# Patient Record
(demographics unavailable — no encounter records)

---

## 2024-10-16 NOTE — HISTORY OF PRESENT ILLNESS
[FreeTextEntry1] : DOI 10/13/24  43 year old female here today for a right middle finger injury sustained three days prior from a slip and fall.  Speaks mostly been Arnaud however her  helps translate.   offered however they deferred.  She was seen in the emergency room where x-rays were remarkable for a right middle finger proximal phalanx injury and splinted in extension.  She is been compliant with the splint ever since.  She reports that the pain has been manageable with Advil.  Denies any paresthesias.  Notable past medical history only for hypertension.

## 2024-10-16 NOTE — DISCUSSION/SUMMARY
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  I had an extensive discussion about conservative versus surgical management and based on the malrotation I did offer her surgery.  We discussed her most likely benign bone cyst at length and possible enchondroma however definitive diagnosis cannot be determined without a biopsy which may not be necessary.  I discussed percutaneous pinning versus percutaneous screw placement.  We discussed the postoperative protocol.  We discussed the risk of surgery to include but not limited to, infection, finger stiffness, malunion, nonunion and malrotation of the finger.  The benefits would be malrotation correction and improved function.  Knowing others and benefits of the procedure she decided to move forward with surgery.  I would like to get her on the schedule for this upcoming Thursday.

## 2024-10-16 NOTE — PHYSICAL EXAM
[de-identified] : Right hand Malrotation of the middle finger in extension and would not let the examiner flex the finger due to pain, I did offer a digital block however patient deferred Middle finger significant swelling Sensation intact to light touch to all distal fingertips Hand is warm and well-perfused [de-identified] : X-rays of the right middle finger from the emergency room, 3 views were evaluated with myself which demonstrated a proximal phalanx fracture through a most likely enchondroma.  2 large enchondromas seen at the middle finger proximal phalanx

## 2024-11-04 NOTE — HISTORY OF PRESENT ILLNESS
[Xray (Date:___)] : [unfilled] Xray -  [Doing Well] : is doing well [No Sign of Infection] : is showing no signs of infection [Adequate Pain Control] : has adequate pain control [de-identified] : DOS 10/24/24  43F postop from right MF pathologic P1 FX s/p pinning and bone grafting  [de-identified] : Right MF  pins- clean with no signs of infection SITLT NO malrotation of finger in extension [de-identified] : pins in place and fx reduced.  [de-identified] : Splint replaced F/u in two weeks for repeat xrays and splint exchange

## 2024-11-19 NOTE — HISTORY OF PRESENT ILLNESS
[Xray (Date:___)] : [unfilled] Xray -  [Doing Well] : is doing well [de-identified] : DOS 10/24/24  43F postop from right MF pathologic P1 FX s/p pinning and bone grafting now 3.5 weeks out from pinning.  [de-identified] : Right MF pins- clean with no signs of infection SITLT NO malrotation of finger in extension. [de-identified] : Imagin24 Xray, right middle finger, 3 views . pins in place and fx reduced. [de-identified] : Splint replaced F/u in one week for repeat xrays and pin removal OT script placed to start once pins removed Discussed with patient using

## 2024-11-19 NOTE — HISTORY OF PRESENT ILLNESS
[Xray (Date:___)] : [unfilled] Xray -  [Doing Well] : is doing well [de-identified] : DOS 10/24/24  43F postop from right MF pathologic P1 FX s/p pinning and bone grafting now 3.5 weeks out from pinning.  [de-identified] : Right MF pins- clean with no signs of infection SITLT NO malrotation of finger in extension. [de-identified] : Imagin24 Xray, right middle finger, 3 views . pins in place and fx reduced. [de-identified] : Splint replaced F/u in one week for repeat xrays and pin removal OT script placed to start once pins removed Discussed with patient using

## 2024-11-26 NOTE — BEGINNING OF VISIT
[] :  [Other: ______] : provided by KYRIE [Interpreters_IDNumber] : [id number] [FreeTextEntry3] : Bryan [Interpreters_FullName] : Rey Beltran

## 2024-11-26 NOTE — BEGINNING OF VISIT
[] :  [Other: ______] : provided by KYRIE [Interpreters_IDNumber] : [id number] [Interpreters_FullName] : Rey Beltran [FreeTextEntry3] : Bryan

## 2024-11-26 NOTE — HISTORY OF PRESENT ILLNESS
[Xray (Date:___)] : [unfilled] Xray -  [Doing Well] : is doing well [Fair Pain Control] : has fair pain control [No Sign of Infection] : is showing no signs of infection [de-identified] : DOS 10/24/24  43F postop from right MF pathologic P1 FX s/p pinning and bone grafting now 4.5 weeks out from pinning. [de-identified] : Right MF pins- clean with no signs of infection removed using sterile technique SITLT + swelling to all fingers  NO malrotation of finger in extension. Would not move fingers due to pain [de-identified] : right middle finger, 3 views. pins removed and fx reduced.  [de-identified] : -OT for gentle ROM -TKO brace as needed, encouraged not to use to prevent finger stiffness.  -F/u 3 weeks for ROM check.  -Medrol dose pack sent to pharmacy for swelling -Extensive discussion on the need to move the fingers to prevent permanent stiffness -F/u 4 weeks for ROM check

## 2024-11-26 NOTE — HISTORY OF PRESENT ILLNESS
[Xray (Date:___)] : [unfilled] Xray -  [Doing Well] : is doing well [Fair Pain Control] : has fair pain control [No Sign of Infection] : is showing no signs of infection [de-identified] : DOS 10/24/24  43F postop from right MF pathologic P1 FX s/p pinning and bone grafting now 4.5 weeks out from pinning. [de-identified] : Right MF pins- clean with no signs of infection removed using sterile technique SITLT + swelling to all fingers  NO malrotation of finger in extension. Would not move fingers due to pain [de-identified] : right middle finger, 3 views. pins removed and fx reduced.  [de-identified] : -OT for gentle ROM -TKO brace as needed, encouraged not to use to prevent finger stiffness.  -F/u 3 weeks for ROM check.  -Medrol dose pack sent to pharmacy for swelling -Extensive discussion on the need to move the fingers to prevent permanent stiffness -F/u 4 weeks for ROM check

## 2024-12-16 NOTE — HISTORY OF PRESENT ILLNESS
[Xray (Date:___)] : [unfilled] Xray -  [Doing Well] : is doing well [Excellent Pain Control] : has excellent pain control [de-identified] : DOS 10/24/24  43F postop from right MF pathologic P1 FX s/p pinning and bone grafting now 8 weeks out from pinning. [de-identified] : Right MF MCP 0- 45  PIP 0-45 DIP 0-30 SITLT NO malrotation of finger in extension. swelling significantly improved [de-identified] :  right middle finger, 3 views. pins removed and fx reduced with signs of healing at fracture site  [de-identified] : D/c brace Cont with OT -Extensive discussion again on the need to move the fingers to prevent permanent stiffness -F/u 3 weeks for ROM check.

## 2025-01-06 NOTE — HISTORY OF PRESENT ILLNESS
[Slow Progress] : is progressing slowly [Adequate Pain Control] : has adequate pain control [de-identified] : DOS 10/24/24  43F postop from right MF pathologic P1 FX s/p pinning and bone grafting now 11 weeks out from pinning. [de-identified] : Right MF MCP 0- 80  PIP 0-80 DIP 0-30 SITLT NO malrotation of finger in extension. swelling significantly improved. [de-identified] : Cont with OT - ROM improving slowly -Extensive discussion again on the need to move the fingers to prevent permanent stiffness -F/u 4 weeks for ROM check.

## 2025-02-12 NOTE — HISTORY OF PRESENT ILLNESS
[Xray (Date:___)] : [unfilled] Xray -  [de-identified] : DOS 10/24/24  43F postop from right MF pathologic P1 FX s/p pinning and bone grafting 3.5 months out from surgery with persistent stiffness [de-identified] : Right MF MCP 0- 90  PIP 0-80 DIP 0-30 SITLT NO malrotation of finger in extension. swelling significantly improved. [de-identified] : 3 views, R MF, healed P1 fracture with partial consolidation of more proximal enchondroma.  [de-identified] : Cont with OT - ROM improving slowly, very apprehensive to any pain. Demonstrated exercises again.  -Extensive discussion again on the need to move the fingers to prevent permanent stiffness.  -F/u 4 weeks for ROM check.

## 2025-03-18 NOTE — HISTORY OF PRESENT ILLNESS
[de-identified] : DOS 10/24/24  43F postop from right MF pathologic P1 FX s/p pinning and bone grafting 4.5 months out from surgery with persistent stiffness. [de-identified] : Right MF MCP 0- 90 PIP 0-110 DIP 0-30 SITLT NO malrotation of finger in extension. swelling significantly improved. [de-identified] : 3/18/25 Xray - . 3 views, R MF, healed P1 fracture with partial consolidation of more proximal enchondroma. [de-identified] : Cont with OT - ROM improving slowly, very apprehensive to any pain. Demonstrated exercises again. -Extensive discussion again on the need to move the fingers to prevent permanent stiffness. -ROM improved from last visit.  -F/u 8 weeks

## 2025-05-14 NOTE — HISTORY OF PRESENT ILLNESS
[Doing Well] : is doing well [de-identified] : DOS 10/24/24  43F postop from right MF pathologic P1 Fx s/p pinning and bone grafting 7 months out from surgery with persistent stiffness and still attending hand therapy. Reports she is able to do her normal activities at home, cooking, cleaning, laundry etc.    [de-identified] : Right MF 0.5cm from palm with flexion  MCP 0- 90 PIP 0-120 DIP 0-30 SITLT NO malrotation of finger in extension. no swelling [de-identified] : - Cont with OT: She still feels she is improving with therapy so we will cont for the next two months.  -ROM improved from last visit. -F/u 8 weeks    I have spent 25 minutes of time on the encounter which excludes teaching and/or separately reported services